# Patient Record
(demographics unavailable — no encounter records)

---

## 2025-02-28 NOTE — HISTORY OF PRESENT ILLNESS
[FreeTextEntry1] : 54 YO [place] speaking male who is a former smoker with no reported past medical history presents to establish cardiovascular care after referral from his primary care physician for an abnormal ECG.   02/27/25:  [employer] - ID# [id number]  Today he has no cardiopulmonary complaints. He works as a  and is on his feet for most his day with no chest pain or shortness of breath. He had an ECG on 02/27/25 that showed sinus rhythm with a rSR' pattern.

## 2025-02-28 NOTE — HISTORY OF PRESENT ILLNESS
[FreeTextEntry1] : 52 YO Citizen of [place] speaking male who is a former smoker with no reported past medical history presents to establish cardiovascular care after referral from his primary care physician for an abnormal ECG.   02/27/25:  [employer] - ID# [id number]  Today he has no cardiopulmonary complaints. He works as a  and is on his feet for most his day with no chest pain or shortness of breath. He had an ECG on 02/27/25 that showed sinus rhythm with a rSR' pattern.

## 2025-02-28 NOTE — REASON FOR VISIT
[FreeTextEntry1] : [employer] ID# [id number]  54 YO male with a history of   presents to establish cardiovascular care after referral from his primary care physician, Dr Rudd.  [Arrhythmia/ECG Abnorrmalities] : arrhythmia/ECG abnormalities

## 2025-02-28 NOTE — REASON FOR VISIT
[FreeTextEntry1] : [employer] ID# [id number]  52 YO male with a history of   presents to establish cardiovascular care after referral from his primary care physician, Dr Rudd.  [Arrhythmia/ECG Abnorrmalities] : arrhythmia/ECG abnormalities

## 2025-02-28 NOTE — ASSESSMENT
[FreeTextEntry1] : ============================================================ 1. Abnormal ECG: sinus rhythm, rsR' pattern    - will obtain structural assessment with echocardiogram (ordered today)    - conservative management   1. Elevated blood pressure:  (02/28/25)    - ambulatory blood pressure monitoring    - will consider initiation of antihypertensive at next visit if BP remains elevated.    - will obtain echocardiogram to assess for LV function.    2. Dyslipidemia     - patient will send over lab results from primary care physician    - will consider initiation of lipid lowering agents once labs reviewed